# Patient Record
Sex: MALE | URBAN - METROPOLITAN AREA
[De-identification: names, ages, dates, MRNs, and addresses within clinical notes are randomized per-mention and may not be internally consistent; named-entity substitution may affect disease eponyms.]

---
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
## 2024-12-22 NOTE — ED BEHAVIORAL HEALTH ASSESSMENT NOTE - NS ED BH ATTENDING SCRIBE STATEMENT
PAST SURGICAL HISTORY:  No significant past surgical history I personally performed the services described in the documentation, reviewed the documentation recorded by the scribe in my presence and it accurately and completely records my words and action.